# Patient Record
Sex: FEMALE | Race: WHITE | NOT HISPANIC OR LATINO | URBAN - METROPOLITAN AREA
[De-identification: names, ages, dates, MRNs, and addresses within clinical notes are randomized per-mention and may not be internally consistent; named-entity substitution may affect disease eponyms.]

---

## 2018-04-02 ENCOUNTER — OUTPATIENT (OUTPATIENT)
Dept: OUTPATIENT SERVICES | Facility: HOSPITAL | Age: 38
LOS: 1 days | Discharge: HOME | End: 2018-04-02

## 2018-04-02 DIAGNOSIS — N89.8 OTHER SPECIFIED NONINFLAMMATORY DISORDERS OF VAGINA: ICD-10-CM

## 2018-04-19 ENCOUNTER — INPATIENT (INPATIENT)
Facility: HOSPITAL | Age: 38
LOS: 2 days | Discharge: HOME | End: 2018-04-22
Attending: OBSTETRICS & GYNECOLOGY | Admitting: OBSTETRICS & GYNECOLOGY

## 2018-04-19 VITALS — SYSTOLIC BLOOD PRESSURE: 109 MMHG | HEART RATE: 88 BPM | DIASTOLIC BLOOD PRESSURE: 61 MMHG

## 2018-04-20 LAB
AMPHET UR-MCNC: NEGATIVE — SIGNIFICANT CHANGE UP
APPEARANCE UR: (no result)
BARBITURATES UR SCN-MCNC: NEGATIVE — SIGNIFICANT CHANGE UP
BASOPHILS # BLD AUTO: 0.02 K/UL — SIGNIFICANT CHANGE UP (ref 0–0.2)
BASOPHILS # BLD AUTO: 0.02 K/UL — SIGNIFICANT CHANGE UP (ref 0–0.2)
BASOPHILS NFR BLD AUTO: 0.1 % — SIGNIFICANT CHANGE UP (ref 0–1)
BASOPHILS NFR BLD AUTO: 0.2 % — SIGNIFICANT CHANGE UP (ref 0–1)
BENZODIAZ UR-MCNC: NEGATIVE — SIGNIFICANT CHANGE UP
BILIRUB UR-MCNC: NEGATIVE — SIGNIFICANT CHANGE UP
BLD GP AB SCN SERPL QL: SIGNIFICANT CHANGE UP
COCAINE METAB.OTHER UR-MCNC: NEGATIVE — SIGNIFICANT CHANGE UP
COLOR SPEC: YELLOW — SIGNIFICANT CHANGE UP
DIFF PNL FLD: NEGATIVE — SIGNIFICANT CHANGE UP
EOSINOPHIL # BLD AUTO: 0.04 K/UL — SIGNIFICANT CHANGE UP (ref 0–0.7)
EOSINOPHIL # BLD AUTO: 0.04 K/UL — SIGNIFICANT CHANGE UP (ref 0–0.7)
EOSINOPHIL NFR BLD AUTO: 0.3 % — SIGNIFICANT CHANGE UP (ref 0–8)
EOSINOPHIL NFR BLD AUTO: 0.4 % — SIGNIFICANT CHANGE UP (ref 0–8)
EPI CELLS # UR: (no result) /HPF
GLUCOSE UR QL: NEGATIVE MG/DL — SIGNIFICANT CHANGE UP
HCT VFR BLD CALC: 34.8 % — LOW (ref 37–47)
HCT VFR BLD CALC: 39 % — SIGNIFICANT CHANGE UP (ref 37–47)
HGB BLD-MCNC: 11.6 G/DL — LOW (ref 12–16)
HGB BLD-MCNC: 13 G/DL — SIGNIFICANT CHANGE UP (ref 12–16)
IMM GRANULOCYTES NFR BLD AUTO: 0.3 % — SIGNIFICANT CHANGE UP (ref 0.1–0.3)
IMM GRANULOCYTES NFR BLD AUTO: 0.5 % — HIGH (ref 0.1–0.3)
KETONES UR-MCNC: 15
LEUKOCYTE ESTERASE UR-ACNC: NEGATIVE — SIGNIFICANT CHANGE UP
LYMPHOCYTES # BLD AUTO: 1.43 K/UL — SIGNIFICANT CHANGE UP (ref 1.2–3.4)
LYMPHOCYTES # BLD AUTO: 1.77 K/UL — SIGNIFICANT CHANGE UP (ref 1.2–3.4)
LYMPHOCYTES # BLD AUTO: 12.6 % — LOW (ref 20.5–51.1)
LYMPHOCYTES # BLD AUTO: 13.2 % — LOW (ref 20.5–51.1)
MCHC RBC-ENTMCNC: 28.9 PG — SIGNIFICANT CHANGE UP (ref 27–31)
MCHC RBC-ENTMCNC: 29.1 PG — SIGNIFICANT CHANGE UP (ref 27–31)
MCHC RBC-ENTMCNC: 33.3 G/DL — SIGNIFICANT CHANGE UP (ref 32–37)
MCHC RBC-ENTMCNC: 33.3 G/DL — SIGNIFICANT CHANGE UP (ref 32–37)
MCV RBC AUTO: 86.8 FL — SIGNIFICANT CHANGE UP (ref 81–99)
MCV RBC AUTO: 87.2 FL — SIGNIFICANT CHANGE UP (ref 81–99)
METHADONE UR-MCNC: NEGATIVE — SIGNIFICANT CHANGE UP
MONOCYTES # BLD AUTO: 0.69 K/UL — HIGH (ref 0.1–0.6)
MONOCYTES # BLD AUTO: 0.72 K/UL — HIGH (ref 0.1–0.6)
MONOCYTES NFR BLD AUTO: 5.1 % — SIGNIFICANT CHANGE UP (ref 1.7–9.3)
MONOCYTES NFR BLD AUTO: 6.4 % — SIGNIFICANT CHANGE UP (ref 1.7–9.3)
NEUTROPHILS # BLD AUTO: 11.48 K/UL — HIGH (ref 1.4–6.5)
NEUTROPHILS # BLD AUTO: 8.64 K/UL — HIGH (ref 1.4–6.5)
NEUTROPHILS NFR BLD AUTO: 79.5 % — HIGH (ref 42.2–75.2)
NEUTROPHILS NFR BLD AUTO: 81.4 % — HIGH (ref 42.2–75.2)
NITRITE UR-MCNC: NEGATIVE — SIGNIFICANT CHANGE UP
NRBC # BLD: 0 /100 WBCS — SIGNIFICANT CHANGE UP (ref 0–0)
NRBC # BLD: 0 /100 WBCS — SIGNIFICANT CHANGE UP (ref 0–0)
OPIATES UR-MCNC: NEGATIVE — SIGNIFICANT CHANGE UP
PCP SPEC-MCNC: SIGNIFICANT CHANGE UP
PH UR: 6 — SIGNIFICANT CHANGE UP (ref 5–8)
PLATELET # BLD AUTO: 187 K/UL — SIGNIFICANT CHANGE UP (ref 130–400)
PLATELET # BLD AUTO: 206 K/UL — SIGNIFICANT CHANGE UP (ref 130–400)
PRENATAL SYPHILIS TEST: SIGNIFICANT CHANGE UP
PROPOXYPHENE QUALITATIVE URINE RESULT: NEGATIVE — SIGNIFICANT CHANGE UP
PROT UR-MCNC: NEGATIVE MG/DL — SIGNIFICANT CHANGE UP
RBC # BLD: 4.01 M/UL — LOW (ref 4.2–5.4)
RBC # BLD: 4.47 M/UL — SIGNIFICANT CHANGE UP (ref 4.2–5.4)
RBC # FLD: 13.6 % — SIGNIFICANT CHANGE UP (ref 11.5–14.5)
RBC # FLD: 13.8 % — SIGNIFICANT CHANGE UP (ref 11.5–14.5)
SP GR SPEC: 1.01 — SIGNIFICANT CHANGE UP (ref 1.01–1.03)
TYPE + AB SCN PNL BLD: SIGNIFICANT CHANGE UP
UROBILINOGEN FLD QL: 0.2 MG/DL — SIGNIFICANT CHANGE UP (ref 0.2–0.2)
WBC # BLD: 10.85 K/UL — HIGH (ref 4.8–10.8)
WBC # BLD: 14.1 K/UL — HIGH (ref 4.8–10.8)
WBC # FLD AUTO: 10.85 K/UL — HIGH (ref 4.8–10.8)
WBC # FLD AUTO: 14.1 K/UL — HIGH (ref 4.8–10.8)

## 2018-04-20 RX ORDER — OXYTOCIN 10 UNIT/ML
41.67 VIAL (ML) INJECTION
Qty: 20 | Refills: 0 | Status: COMPLETED | OUTPATIENT
Start: 2018-04-20

## 2018-04-20 RX ORDER — OXYTOCIN 10 UNIT/ML
41.67 VIAL (ML) INJECTION
Qty: 20 | Refills: 0 | Status: DISCONTINUED | OUTPATIENT
Start: 2018-04-20 | End: 2018-04-22

## 2018-04-20 RX ORDER — DIPHENHYDRAMINE HCL 50 MG
25 CAPSULE ORAL EVERY 6 HOURS
Qty: 0 | Refills: 0 | Status: DISCONTINUED | OUTPATIENT
Start: 2018-04-20 | End: 2018-04-22

## 2018-04-20 RX ORDER — ACETAMINOPHEN 500 MG
650 TABLET ORAL EVERY 6 HOURS
Qty: 0 | Refills: 0 | Status: DISCONTINUED | OUTPATIENT
Start: 2018-04-20 | End: 2018-04-22

## 2018-04-20 RX ORDER — SODIUM CHLORIDE 9 MG/ML
3 INJECTION INTRAMUSCULAR; INTRAVENOUS; SUBCUTANEOUS EVERY 8 HOURS
Qty: 0 | Refills: 0 | Status: DISCONTINUED | OUTPATIENT
Start: 2018-04-20 | End: 2018-04-22

## 2018-04-20 RX ORDER — GLYCERIN ADULT
1 SUPPOSITORY, RECTAL RECTAL AT BEDTIME
Qty: 0 | Refills: 0 | Status: DISCONTINUED | OUTPATIENT
Start: 2018-04-20 | End: 2018-04-22

## 2018-04-20 RX ORDER — AER TRAVELER 0.5 G/1
1 SOLUTION RECTAL; TOPICAL EVERY 4 HOURS
Qty: 0 | Refills: 0 | Status: DISCONTINUED | OUTPATIENT
Start: 2018-04-20 | End: 2018-04-22

## 2018-04-20 RX ORDER — DIBUCAINE 1 %
1 OINTMENT (GRAM) RECTAL EVERY 4 HOURS
Qty: 0 | Refills: 0 | Status: DISCONTINUED | OUTPATIENT
Start: 2018-04-20 | End: 2018-04-22

## 2018-04-20 RX ORDER — DOCUSATE SODIUM 100 MG
100 CAPSULE ORAL
Qty: 0 | Refills: 0 | Status: DISCONTINUED | OUTPATIENT
Start: 2018-04-20 | End: 2018-04-22

## 2018-04-20 RX ORDER — HYDROCORTISONE 1 %
1 OINTMENT (GRAM) TOPICAL ONCE
Qty: 0 | Refills: 0 | Status: COMPLETED | OUTPATIENT
Start: 2018-04-20 | End: 2018-04-20

## 2018-04-20 RX ORDER — HYDROCORTISONE 1 %
1 OINTMENT (GRAM) TOPICAL EVERY 8 HOURS
Qty: 0 | Refills: 0 | Status: DISCONTINUED | OUTPATIENT
Start: 2018-04-20 | End: 2018-04-20

## 2018-04-20 RX ORDER — HYDROCORTISONE 1 %
1 OINTMENT (GRAM) TOPICAL DAILY
Qty: 0 | Refills: 0 | Status: DISCONTINUED | OUTPATIENT
Start: 2018-04-20 | End: 2018-04-22

## 2018-04-20 RX ORDER — HYDROCORTISONE 1 %
OINTMENT (GRAM) TOPICAL
Qty: 0 | Refills: 0 | Status: DISCONTINUED | OUTPATIENT
Start: 2018-04-20 | End: 2018-04-20

## 2018-04-20 RX ORDER — SODIUM CHLORIDE 9 MG/ML
1000 INJECTION, SOLUTION INTRAVENOUS
Qty: 0 | Refills: 0 | Status: DISCONTINUED | OUTPATIENT
Start: 2018-04-20 | End: 2018-04-20

## 2018-04-20 RX ORDER — SIMETHICONE 80 MG/1
80 TABLET, CHEWABLE ORAL EVERY 6 HOURS
Qty: 0 | Refills: 0 | Status: DISCONTINUED | OUTPATIENT
Start: 2018-04-20 | End: 2018-04-22

## 2018-04-20 RX ORDER — IBUPROFEN 200 MG
600 TABLET ORAL EVERY 6 HOURS
Qty: 0 | Refills: 0 | Status: DISCONTINUED | OUTPATIENT
Start: 2018-04-20 | End: 2018-04-22

## 2018-04-20 RX ORDER — LANOLIN
1 OINTMENT (GRAM) TOPICAL EVERY 6 HOURS
Qty: 0 | Refills: 0 | Status: DISCONTINUED | OUTPATIENT
Start: 2018-04-20 | End: 2018-04-22

## 2018-04-20 RX ORDER — OXYCODONE AND ACETAMINOPHEN 5; 325 MG/1; MG/1
2 TABLET ORAL EVERY 6 HOURS
Qty: 0 | Refills: 0 | Status: DISCONTINUED | OUTPATIENT
Start: 2018-04-20 | End: 2018-04-22

## 2018-04-20 RX ORDER — SODIUM CHLORIDE 9 MG/ML
500 INJECTION, SOLUTION INTRAVENOUS ONCE
Qty: 0 | Refills: 0 | Status: DISCONTINUED | OUTPATIENT
Start: 2018-04-20 | End: 2018-04-20

## 2018-04-20 RX ORDER — MAGNESIUM HYDROXIDE 400 MG/1
30 TABLET, CHEWABLE ORAL
Qty: 0 | Refills: 0 | Status: DISCONTINUED | OUTPATIENT
Start: 2018-04-20 | End: 2018-04-22

## 2018-04-20 RX ADMIN — Medication 1 SUPPOSITORY(S): at 16:58

## 2018-04-20 RX ADMIN — Medication 125 MILLIUNIT(S)/MIN: at 04:28

## 2018-04-20 RX ADMIN — Medication 1 SUPPOSITORY(S): at 15:17

## 2018-04-20 RX ADMIN — Medication 1 SUPPOSITORY(S): at 06:14

## 2018-04-20 RX ADMIN — Medication 600 MILLIGRAM(S): at 04:26

## 2018-04-20 NOTE — OB PROVIDER H&P - ASSESSMENT
36 yo  @ 38w1d, GBS neg, in early labor  -admit  -IVF  -labs  -continous toco/efm  -epidural    Dr. Casper and Dr. Kirby aware

## 2018-04-20 NOTE — PROGRESS NOTE ADULT - SUBJECTIVE AND OBJECTIVE BOX
PGY1 note    Pt seen at bedside, sleeping comfortably. s/p epidural.    T(C): 36.8 (18 @ 00:11), Max: 36.8 (18 @ 00:11)  HR: 65 (18 @ 01:53)  BP: 95/51 (18 @ 01:53)  RR: 18 (18 @ 00:11)    EFM: 135/mod/accels+  Newborn: q4 min.  SVE: deferred, last exam at 0055 was 6/80/-2, cephalic, intact (Dr. Pedroza)    meds:  0057 epidural    labs:                        13.0   14.10 )-----------( 206      ( 2018 00:37 )             39.0   RPR NR    A/P:  36 y/o  at 38w1d, GBS negative, in active labor, s/p epidural.   - continuous EFM and toco  - pain management PRN  - f/u T&S, UA, UDS    Dr. Kirby and Dr. Casper aware.

## 2018-04-20 NOTE — OB PROVIDER H&P - NSHPPHYSICALEXAM_GEN_ALL_CORE
Vital Signs Last 24 Hrs  T(C): 36.8 (20 Apr 2018 00:11), Max: 36.8 (20 Apr 2018 00:11)  T(F): 98.3 (20 Apr 2018 00:11), Max: 98.3 (20 Apr 2018 00:11)  HR: 82 (20 Apr 2018 00:17) (82 - 93)  BP: 135/68 (20 Apr 2018 00:17) (109/61 - 152/63)  RR: 18 (20 Apr 2018 00:11) (18 - 18)  Abd: NT, gravid, palpable ctx  /mod ryne/+accels  TOCO q4-5  SVE 4/60/-2, vtx, intact

## 2018-04-20 NOTE — OB PROVIDER H&P - HISTORY OF PRESENT ILLNESS
36 yo  @ 38w1d, EDC 5/3/18 by first trimester sono presents with ctx, starting this evening, progressively worsening, q5 mins, 8/10 in intensity. No VB, LOF. Good FM. No complications during this pregnancy.

## 2018-04-20 NOTE — PROCEDURE NOTE - NSANESPROCED_OBGYN_ALL_OB
Epidural Catheter Placement/Continuous Spinal Epidural fentanyl 25mcg/Epidural Catheter Placement/Combined Spinal Epidural/Intrthecal Narcotic/Continuous Spinal Epidural

## 2018-04-20 NOTE — OB PROVIDER H&P - NS_OBGYNHISTORY_OBGYN_ALL_OB_FT
NVSD 7-14lbs, uncomplicated  2016  7-15lbs, uncomplicated    no h/o fibroids, cysts, STIs, abnormal pap smears

## 2018-04-20 NOTE — OB PROVIDER DELIVERY SUMMARY - NSPROVIDERDELIVERYNOTE_OBGYN_ALL_OB_FT
Pt became fully dilated and pushed well over intact perineum, delivery of head in OP position, nose and mouth bulb suctioned on perineum, can x 1 loose reduced, followed by delivery of shoulders and body without difficulty, live male infant, APGARs 9/9, 3300gms, 3-vessel cord + placenta complete, no complications, CBR collected

## 2018-04-20 NOTE — PROCEDURE NOTE - NSINFORMCONSENT_GEN_A_CORE
Benefits, risks, and possible complications of procedure explained to patient/caregiver who verbalized understanding and gave written consent. This was an emergent procedure.

## 2018-04-21 RX ADMIN — Medication 600 MILLIGRAM(S): at 01:32

## 2018-04-21 RX ADMIN — Medication 1 SUPPOSITORY(S): at 11:11

## 2018-04-21 NOTE — PROGRESS NOTE ADULT - SUBJECTIVE AND OBJECTIVE BOX
Pt seen at bedside, no complaints, nursing    Vital Signs Last 24 Hrs  T(C): 36.1 (21 Apr 2018 07:15), Max: 36.3 (20 Apr 2018 15:25)  T(F): 96.9 (21 Apr 2018 07:15), Max: 97.4 (20 Apr 2018 15:25)  HR: 68 (21 Apr 2018 07:15) (68 - 79)  BP: 99/56 (21 Apr 2018 07:15) (99/56 - 117/62)  RR: 18 (21 Apr 2018 07:15) (18 - 18)    Resp - CTA b/l  CVS - S1S2+, RRR  Breasts - soft, NT  Abd - soft, NTND, BS+, uterus - firm  VE - Moderate lochia, perineum- intact  Ext - No Homans    LABS    14.1 < 13.0/39.0 > 206  (20 Apr 2018  00:37)                          11.6   10.85 )-----------( 187      ( 20 Apr 2018 17:56 )             34.8     A Pos, Rubella - Immune, RPR - NR

## 2018-04-22 VITALS
RESPIRATION RATE: 16 BRPM | SYSTOLIC BLOOD PRESSURE: 107 MMHG | TEMPERATURE: 98 F | HEART RATE: 74 BPM | DIASTOLIC BLOOD PRESSURE: 63 MMHG

## 2018-04-22 RX ORDER — IBUPROFEN 200 MG
1 TABLET ORAL
Qty: 0 | Refills: 0 | COMMUNITY
Start: 2018-04-22

## 2018-04-22 RX ADMIN — Medication 1 SUPPOSITORY(S): at 12:01

## 2018-04-22 NOTE — PROGRESS NOTE ADULT - SUBJECTIVE AND OBJECTIVE BOX
Pt seen at bedside, no complaints, nursing    Vital Signs Last 24 Hrs  T(C): 36.6 (22 Apr 2018 07:42), Max: 36.8 (22 Apr 2018 00:03)  T(F): 97.8 (22 Apr 2018 07:42), Max: 98.2 (22 Apr 2018 00:03)  HR: 74 (22 Apr 2018 07:42) (62 - 74)  BP: 107/63 (22 Apr 2018 07:42) (107/63 - 127/68)  RR: 16 (22 Apr 2018 07:42) (16 - 20)    Resp - CTA b/l  CVS - S1S2+, RRR  Breasts - soft, NT  Abd - soft, NTND, BS+, uterus - firm  VE - Minimal lochia, perineum- intact  Ext - No Homans

## 2018-04-22 NOTE — DISCHARGE NOTE OB - CARE PROVIDER_API CALL
Evin Casper), Obstetrics and Gynecology  [address] [place] [address]  Phone: [phone]  Fax: [phone]

## 2018-04-26 DIAGNOSIS — O26.893 OTHER SPECIFIED PREGNANCY RELATED CONDITIONS, THIRD TRIMESTER: ICD-10-CM

## 2018-04-26 DIAGNOSIS — O09.523 SUPERVISION OF ELDERLY MULTIGRAVIDA, THIRD TRIMESTER: ICD-10-CM

## 2018-04-26 DIAGNOSIS — Z3A.38 38 WEEKS GESTATION OF PREGNANCY: ICD-10-CM

## 2018-04-26 DIAGNOSIS — Z34.83 ENCOUNTER FOR SUPERVISION OF OTHER NORMAL PREGNANCY, THIRD TRIMESTER: ICD-10-CM

## 2019-07-05 ENCOUNTER — OUTPATIENT (OUTPATIENT)
Dept: OUTPATIENT SERVICES | Facility: HOSPITAL | Age: 39
LOS: 1 days | Discharge: HOME | End: 2019-07-05
Payer: COMMERCIAL

## 2019-07-05 DIAGNOSIS — R22.1 LOCALIZED SWELLING, MASS AND LUMP, NECK: ICD-10-CM

## 2019-07-05 PROCEDURE — 76536 US EXAM OF HEAD AND NECK: CPT | Mod: 26

## 2019-11-27 ENCOUNTER — EMERGENCY (EMERGENCY)
Facility: HOSPITAL | Age: 39
LOS: 0 days | Discharge: HOME | End: 2019-11-27
Attending: EMERGENCY MEDICINE | Admitting: EMERGENCY MEDICINE
Payer: COMMERCIAL

## 2019-11-27 VITALS
OXYGEN SATURATION: 98 % | TEMPERATURE: 98 F | HEART RATE: 79 BPM | DIASTOLIC BLOOD PRESSURE: 69 MMHG | SYSTOLIC BLOOD PRESSURE: 120 MMHG

## 2019-11-27 DIAGNOSIS — R42 DIZZINESS AND GIDDINESS: ICD-10-CM

## 2019-11-27 DIAGNOSIS — F10.99 ALCOHOL USE, UNSPECIFIED WITH UNSPECIFIED ALCOHOL-INDUCED DISORDER: ICD-10-CM

## 2019-11-27 DIAGNOSIS — G43.809 OTHER MIGRAINE, NOT INTRACTABLE, WITHOUT STATUS MIGRAINOSUS: ICD-10-CM

## 2019-11-27 PROCEDURE — 99284 EMERGENCY DEPT VISIT MOD MDM: CPT

## 2019-11-27 PROCEDURE — 70450 CT HEAD/BRAIN W/O DYE: CPT | Mod: 26

## 2019-11-27 NOTE — ED PROVIDER NOTE - PATIENT PORTAL LINK FT
You can access the FollowMyHealth Patient Portal offered by [facility] by registering at the following website: [email or web address]. By joining "[employer]’s FollowMyHealth portal, you will also be able to view your health information using other applications (apps) compatible with our system.

## 2019-11-27 NOTE — ED PROVIDER NOTE - CARE PROVIDER_API CALL
Rk Martin (DO)  Medicine  [address] [place] [id number]  Phone: [phone]  Fax: [phone]  Established Patient  Follow Up Time: Rk Martin (DO)  Medicine  [address] [place] [address]  Phone: [phone]  Fax: [phone]  Established Patient  Follow Up Time:     Trevon Bueno)  Neurology  [address] [place] [address]  Phone: [phone]  Fax: [phone]  Follow Up Time:

## 2019-11-27 NOTE — ED ADULT NURSE NOTE - OBJECTIVE STATEMENT
Patient present to ED with complains of dizziness and numbness on left side of face that started yesterday. Denies syncope, nausea, vomiting, chest pain, and fever.

## 2019-11-27 NOTE — ED PROVIDER NOTE - NS ED ROS FT
Constitutional: No fever, chills, unintended weight loss.  Eyes:  see hpi; no eye pain or discharge.  ENMT:  No hearing changes, pain, no sore throat or runny nose, no difficulty swallowing  Cardiac:  No chest pain, SOB or edema. No chest pain with exertion.  Respiratory:  No cough or respiratory distress. No hemoptysis. No history of asthma or RAD.  GI:  No nausea, vomiting, diarrhea or abdominal pain.  :  No dysuria, frequency or burning.  MS:  No myalgia, muscle weakness, joint pain or back pain.  Neuro:  see hpi  Skin:  No skin rash.   Endocrine: No history of thyroid disease or diabetes.

## 2019-11-27 NOTE — ED PROVIDER NOTE - NSFOLLOWUPCLINICS_GEN_ALL_ED_FT
[facility]  Neurology  [address] [place] [address], [facility] [address]  [place] [address]  Phone: [phone]  Fax:   Follow Up Time:

## 2019-11-27 NOTE — ED PROVIDER NOTE - PHYSICAL EXAMINATION
VITAL SIGNS: I have reviewed nursing notes and confirm.  CONSTITUTIONAL: Well-developed; well-nourished; in no acute distress.  SKIN: Skin exam is warm and dry, no acute rash.  HEAD: Normocephalic; atraumatic.  EYES: PERRL, EOM intact; conjunctiva and sclera clear.  ENT: No nasal discharge; airway clear.  NECK: Supple; non tender.  CARD: S1, S2 normal; no murmurs, gallops, or rubs. Regular rate and rhythm.  RESP: No wheezes, rales or rhonchi.  ABD: Normal bowel sounds; soft; non-distended; non-tender; no hepatosplenomegaly; no rgr.  BACK: non-tender, no CVAT  EXT: Normal ROM. No clubbing, cyanosis or edema. DPI.  NEURO: aaox3, cn 2-12 intact bl no nystagmus or facial droop, 5/5 strength x 4 no drift, gross sensation intact, finger-nose nl, gait nl, romberg neg   PSYCH: Cooperative, appropriate.

## 2019-11-27 NOTE — ED PROVIDER NOTE - PROVIDER TOKENS
PROVIDER:[TOKEN:[[id number]],ESTABLISHEDPATIENT:[T]] PROVIDER:[TOKEN:[[id number]],ESTABLISHEDPATIENT:[T]],PROVIDER:[TOKEN:[[id number]]]

## 2019-11-27 NOTE — ED PROVIDER NOTE - CLINICAL SUMMARY MEDICAL DECISION MAKING FREE TEXT BOX
complicated/ocular migraine, h/o same - neuro exam nf, cth unremarkable, pt currently asx - all results d/w pt & copies given, strict return precautions discussed, rec outpt PMD/Neuro f/u

## 2019-11-27 NOTE — ED ADULT NURSE NOTE - NSIMPLEMENTINTERV_GEN_ALL_ED
Implemented All Universal Safety Interventions:  [place] to call system. Call bell, personal items and telephone within reach. Instruct patient to call for assistance. Room bathroom lighting operational. Non-slip footwear when patient is off stretcher. Physically safe environment: no spills, clutter or unnecessary equipment. Stretcher in lowest position, wheels locked, appropriate side rails in place.

## 2019-11-27 NOTE — ED PROVIDER NOTE - NSFOLLOWUPINSTRUCTIONS_ED_ALL_ED_FT
Migraine Headache  ImageA migraine headache is an intense, throbbing pain on one side or both sides of the head. Migraines may also cause other symptoms, such as nausea, vomiting, and sensitivity to light and noise.    What are the causes?  Doing or taking certain things may also trigger migraines, such as:    Alcohol.  Smoking.  Medicines, such as:    Medicine used to treat chest pain (nitroglycerine).  Birth control pills.  Estrogen pills.  Certain blood pressure medicines.    Aged cheeses, chocolate, or caffeine.  Foods or drinks that contain nitrates, glutamate, aspartame, or tyramine.  Physical activity.    Other things that may trigger a migraine include:    Menstruation.  Pregnancy.  Hunger.  Stress, lack of sleep, too much sleep, or fatigue.  Weather changes.    What increases the risk?  The following factors may make you more likely to experience migraine headaches:    Age. Risk increases with age.  Family history of migraine headaches.  Being .  Depression and anxiety.  Obesity.  Being a woman.  Having a hole in the heart (patent foramen ovale) or other heart problems.    What are the signs or symptoms?  The main symptom of this condition is pulsating or throbbing pain. Pain may:    Happen in any area of the head, such as on one side or both sides.  Interfere with daily activities.  Get worse with physical activity.  Get worse with exposure to bright lights or loud noises.    Other symptoms may include:    Nausea.  Vomiting.  Dizziness.  General sensitivity to bright lights, loud noises, or smells.    Before you get a migraine, you may get warning signs that a migraine is developing (aura). An aura may include:    Seeing flashing lights or having blind spots.  Seeing bright spots, halos, or zigzag lines.  Having tunnel vision or blurred vision.  Having numbness or a tingling feeling.  Having trouble talking.  Having muscle weakness.    How is this diagnosed?  A migraine headache can be diagnosed based on:    Your symptoms.  A physical exam.  Tests, such as CT scan or MRI of the head. These imaging tests can help rule out other causes of headaches.  Taking fluid from the spine (lumbar puncture) and analyzing it (cerebrospinal fluid analysis, or CSF analysis).    How is this treated?  A migraine headache is usually treated with medicines that:    Relieve pain.  Relieve nausea.  Prevent migraines from coming back.    Treatment may also include:    Acupuncture.  Lifestyle changes like avoiding foods that trigger migraines.    Follow these instructions at home:  Medicines     Take over-the-counter and prescription medicines only as told by your health care provider.  Do not drive or use heavy machinery while taking prescription pain medicine.  To prevent or treat constipation while you are taking prescription pain medicine, your health care provider may recommend that you:    Drink enough fluid to keep your urine clear or pale yellow.  Take over-the-counter or prescription medicines.  Eat foods that are high in fiber, such as fresh fruits and vegetables, whole grains, and beans.  Limit foods that are high in fat and processed sugars, such as fried and sweet foods.    Lifestyle     Avoid alcohol use.  Do not use any products that contain nicotine or tobacco, such as cigarettes and e-cigarettes. If you need help quitting, ask your health care provider.  Get at least 8 hours of sleep every night.  Limit your stress.  General instructions     Image   Keep a journal to find out what may trigger your migraine headaches. For example, write down:    What you eat and drink.  How much sleep you get.  Any change to your diet or medicines.    If you have a migraine:    Avoid things that make your symptoms worse, such as bright lights.  It may help to lie down in a dark, quiet room.  Do not drive or use heavy machinery.  Ask your health care provider what activities are safe for you while you are experiencing symptoms.    Keep all follow-up visits as told by your health care provider. This is important.  Contact a health care provider if:  You develop symptoms that are different or more severe than your usual migraine symptoms.  Get help right away if:  Your migraine becomes severe.  You have a fever.  You have a stiff neck.  You have vision loss.  Your muscles feel weak or like you cannot control them.  You start to lose your balance often.  You develop trouble walking.  You faint.  This information is not intended to replace advice given to you by your health care provider. Make sure you discuss any questions you have with your health care provider.

## 2019-11-27 NOTE — ED PROVIDER NOTE - OBJECTIVE STATEMENT
39y f h/o complicated/ocular migraines during pregnancy p/w HA x 3d. Rpts R sided scotoma 3d ago, accomp by paresthesias to R chin, lasted appx 1h, accomp by generalized HA afterwards which resolved spontaneously. Scotoma recurred again yest, bl, accomp by HA. Pt was unable to come to ED yest evening so came today. Currently without any sx. Rpts similar sx during her last pregnancy (scotoma, R chin paresthesia, HA), was dx @ that time with ocular/complicated migraine, never had head imaging or formal Neuro eval. Denies any f/c, uri sx, current ha or blurry vision, neck pain or stiffness, cp/sob, nvd, abd pain, focal numbness or weakness. No recent falls or head trauma. PMD Racco.

## 2021-05-13 NOTE — PATIENT PROFILE, NEWBORN NICU. - NS_DATEOFLASTVISIT_OBGYN_ALL_OB_DT
Impression: Age-related nuclear cataract, left eye: H25.12. Plan: Trace. Continue to monitor annually.
Impression: Corneal abrasion without FB of eye, sequela: S05.00xS. Plan: Cornea healed (RCE). Pt has DC'd all gtts. RTC with any pain to monitor for erosion.
Impression: Myopia, bilateral: H52.13. Plan: Discussed. New glasses Rx given today.
Impression: Traumatic cataract of right eye Plan: No treatment currently recommended due to level of vision. Patient will monitor vision changes and contact us with any decrease in vision. Will re-evaluate cataract on return visit. Recommend UV protection/sunglassesand update glasses with today's MRx. Discussed. RTC 1 year to re-evaluate cataract, with Mac OCT and Gonio.
16-Apr-2018

## 2022-10-27 ENCOUNTER — OUTPATIENT (OUTPATIENT)
Dept: OUTPATIENT SERVICES | Facility: HOSPITAL | Age: 42
LOS: 1 days | Discharge: HOME | End: 2022-10-27

## 2022-10-27 DIAGNOSIS — R92.8 OTHER ABNORMAL AND INCONCLUSIVE FINDINGS ON DIAGNOSTIC IMAGING OF BREAST: ICD-10-CM

## 2022-10-27 PROCEDURE — 77066 DX MAMMO INCL CAD BI: CPT | Mod: 26

## 2022-10-27 PROCEDURE — 76641 ULTRASOUND BREAST COMPLETE: CPT | Mod: 26,50

## 2022-10-27 PROCEDURE — G0279: CPT | Mod: 26

## 2023-09-08 NOTE — OB RN DELIVERY SUMMARY - NS_CORDBLDGAREASA_OBGYN_ALL_OB
Called and left VM for mother that pt's swallow study results were normal. Advised to call back with any questions or concerns.
Umbilical cord sample was not obtained

## 2024-01-22 NOTE — ED PROVIDER NOTE - NS ED MD EM SELECTION
Orders Placed This Encounter   Procedures    Wound cleansing and dressings Abdomen     Remove abdominal dressing, wash with soap and water (Dove for sensitive skin)     May apply Zinc Oxide to surrounding skin with each dressing change     Loosely Pack the wound with Dakins moistened Plain Packing and leave a tail out taped to the skin. Do not pack too tightly.  Cover with ABD pad secure with tape.   Continue this dressing until 1-27-24                       Then Starting on 1-28-24 Apply silvergel (this is available over the counter) to the plain packing then loosely pack the wound    Change dressing 1-2 x daily and as needed for leakage.    Treatments completed as above today at the Wound Center     Please call the [facility] Monday through Friday between the hours of 8:00 AM and 4:30 PM at [phone] if you have any questions, if you experience any major changes in your wound(s) or for any signs or symptoms of infection such as fever; changes in the redness, swelling, drainage, or odor of your wound. After hours, weekends or holidays please contact your primary care physician or go to the hospital emergency room.    Follow up in 2 weeks     Standing Status:   Future     Standing Expiration Date:   1/22/2025       [id number] Detailed

## 2025-06-10 NOTE — OB RN DELIVERY SUMMARY - NS_RNDELIVATTEST_OBGYN_ALL_OB
Please see request, thank you   OB Provider reported that the vagina was inspected and no foreign body was found